# Patient Record
Sex: MALE | Race: WHITE
[De-identification: names, ages, dates, MRNs, and addresses within clinical notes are randomized per-mention and may not be internally consistent; named-entity substitution may affect disease eponyms.]

---

## 2017-01-09 ENCOUNTER — HOSPITAL ENCOUNTER (EMERGENCY)
Dept: HOSPITAL 10 - FTE | Age: 22
Discharge: HOME | End: 2017-01-09
Payer: COMMERCIAL

## 2017-01-09 VITALS — HEIGHT: 60 IN | BODY MASS INDEX: 26.84 KG/M2 | WEIGHT: 136.69 LBS

## 2017-01-09 DIAGNOSIS — J45.909: ICD-10-CM

## 2017-01-09 DIAGNOSIS — S69.92XA: Primary | ICD-10-CM
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
## 2017-01-09 PROCEDURE — 73140 X-RAY EXAM OF FINGER(S): CPT

## 2017-01-09 PROCEDURE — 29130 APPL FINGER SPLINT STATIC: CPT

## 2017-01-09 NOTE — ERD
ER Documentation


Chief Complaint


Date/Time


DATE: 1/9/17 


TIME: 16:26


Chief Complaint


left middle finger blunt trauma. no distress noted.





HPI


This 21-year-old male presents with left middle finger pain after trauma today.

  States that he hit it with a pole that he was pulling on.  He has pain 

primarily at the left third PIP joint without restricted range of motion or 

weakness.  He is a very tiny abrasion without bleeding or lacerations.





ROS


All systems reviewed and are negative except as per history of present illness.





Medications


Home Meds


Active Scripts


Ibuprofen* (Motrin*) 600 Mg Tab, 600 MG PO Q6, #30 TAB


   Prov:SAVAGE SEYMOUR PA-C         4/5/16


[Levetiracetam] 500 MG TAB No Conflict Check, 500 MG PO BID, TAB


   Prov:BOUBACAR RODRIGUEZ MD         9/22/15





Allergies


Allergies:  


Coded Allergies:  


     Penicillins (Verified  Allergy, Mild, RASH, 9/22/15)





PMhx/Soc


History of Surgery:  No


Anesthesia Reaction:  No


Hx Neurological Disorder:  No


Hx Respiratory Disorders:  Yes (ASTHMA,)


Hx Cardiac Disorders:  No


Hx Psychiatric Problems:  No


Hx Miscellaneous Medical Probl:  No


Hx Alcohol Use:  No


Hx Substance Use:  No


Hx Tobacco Use:  No


Smoking Status:  Never smoker





Physical Exam


Vitals





Vital Signs








  Date Time  Temp Pulse Resp B/P Pulse Ox O2 Delivery O2 Flow Rate FiO2


 


1/9/17 15:33 98.8 69 20 124/89 98   








Physical Exam


Const:  [] Alert, non-ill-appearing.


Head:   Atraumatic 


Eyes:    Normal Conjunctiva


ENT:    Normal External Ears, Nose and Mouth.


Neck:               Full range of motion..~ No meningismus.


Resp:    Clear to auscultation bilaterally


Cardio:    Regular rate and rhythm, no murmurs


Abd:    Soft, non tender, non distended. Normal bowel sounds


Skin:    No petechiae or rashes


Back:    No midline or flank tenderness


Ext:    No cyanosis, or edema.  There is mild tenderness and swelling around 

the left third PIP joint.  There is no appreciable deformities.  Patient is 

full range of motion flexion extension of the MCP, PIP and DIP joints.  There 

is a very superficial abrasion medially to the PIP joint.


Neur:    Awake and alert


Psych:    Normal Mood and Affect


Results 24 hrs





 Current Medications








 Medications


  (Trade)  Dose


 Ordered  Sig/Pauline


 Route


 PRN Reason  Start Time


 Stop Time Status Last Admin


Dose Admin


 


 Ibuprofen


  (Motrin)  600 mg  ONCE  ONCE


 PO


   1/9/17 16:00


 1/9/17 16:01 DC  


 











Procedures/MDM


X-ray left middle finger 2V Interpreted by me: 


Bones:                 [No fracture]


Joints:                  [No dislocation]


Foreign body:         [None].  Impression-normal left middle finger x-ray


Patient was placed in a left middle finger extension metal splint.  Patient was 

neurovascular intact after splint.  Patient presents with a small abrasion and 

appears to be a left middle finger sprain without evidence of fracture, 

dislocation, tendon or neurologic deficit or bacterial infection.  We treated 

at home with a splint and further observation.  He is advised to follow-up with 

primary doctor for persistent pain this week or return for fevers, redness, new 

symptoms.





Departure


Diagnosis:  


 Primary Impression:  


 Finger injury


 Encounter type:  initial encounter  Laterality:  left  Qualified Code:  

S69.92XA - Finger injury, left, initial encounter


Condition:  Stable


Patient Instructions:  Sprain Finger





Additional Instructions:  


X-ray read as normal.  Recheck with primary doctor orthopedist for pain next 

week.  Use splint until pain resolves.  Recheck sooner for fevers, redness, new 

symptoms.  Take Tylenol or Advil for pain.











KIRBY ANTHONY MD Jan 9, 2017 16:27

## 2017-01-09 NOTE — RADRPT
PROCEDURE:   Left third digit series 

 

CLINICAL INDICATION:   Pain status post trauma 

 

TECHNIQUE:   Three views.

 

COMPARISON:   Left wrist series 09/21/2015 

 

FINDINGS:

 

No fractures are noted. Joint spaces are well maintained. No erosions are identified.  The soft tiss
ues are unremarkable..

 

IMPRESSION:

 

1.  Normal left third digit series

 

 

 

RPTAT: HDC

_____________________________________________ 

.Niesha Oreilly MD, MD           Date    Time 

Electronically viewed and signed by .Niesha Oreilly MD, MD on 01/09/2017 16:14 

 

D:  01/09/2017 16:14  T:  01/09/2017 16:14

.C/

## 2018-05-19 ENCOUNTER — HOSPITAL ENCOUNTER (EMERGENCY)
Age: 23
Discharge: HOME | End: 2018-05-19

## 2018-05-19 ENCOUNTER — HOSPITAL ENCOUNTER (EMERGENCY)
Dept: HOSPITAL 91 - E/R | Age: 23
Discharge: HOME | End: 2018-05-19
Payer: MEDICAID

## 2018-05-19 DIAGNOSIS — M25.512: Primary | ICD-10-CM

## 2018-05-19 DIAGNOSIS — J45.909: ICD-10-CM

## 2018-05-19 PROCEDURE — 99283 EMERGENCY DEPT VISIT LOW MDM: CPT

## 2019-01-01 ENCOUNTER — HOSPITAL ENCOUNTER (EMERGENCY)
Dept: HOSPITAL 10 - FTE | Age: 24
Discharge: HOME | End: 2019-01-01
Payer: MEDICAID

## 2019-01-01 ENCOUNTER — HOSPITAL ENCOUNTER (EMERGENCY)
Dept: HOSPITAL 91 - FTE | Age: 24
Discharge: HOME | End: 2019-01-01
Payer: COMMERCIAL

## 2019-01-01 VITALS — WEIGHT: 110.23 LBS | BODY MASS INDEX: 21.64 KG/M2 | HEIGHT: 60 IN

## 2019-01-01 VITALS — DIASTOLIC BLOOD PRESSURE: 62 MMHG | SYSTOLIC BLOOD PRESSURE: 118 MMHG | HEART RATE: 77 BPM | RESPIRATION RATE: 18 BRPM

## 2019-01-01 DIAGNOSIS — R19.7: Primary | ICD-10-CM

## 2019-01-01 DIAGNOSIS — J45.909: ICD-10-CM

## 2019-01-01 DIAGNOSIS — R11.2: ICD-10-CM

## 2019-01-01 PROCEDURE — 96372 THER/PROPH/DIAG INJ SC/IM: CPT

## 2019-01-01 PROCEDURE — 99284 EMERGENCY DEPT VISIT MOD MDM: CPT

## 2019-01-01 RX ADMIN — KETOROLAC TROMETHAMINE 1 MG: 30 INJECTION, SOLUTION INTRAMUSCULAR at 17:58

## 2019-01-01 RX ADMIN — ONDANSETRON 1 MG: 4 TABLET, ORALLY DISINTEGRATING ORAL at 16:19

## 2019-01-01 RX ADMIN — ACETAMINOPHEN 1 MG: 325 TABLET, FILM COATED ORAL at 16:19

## 2019-01-01 NOTE — ERD
ER Documentation


Chief Complaint


Chief Complaint





abd pain, nausea, diarrhea, chills, bodyaches, fever





HPI


23-year-old male patient with no significant past medical history presents to 


the ED stating that he has vomiting and diarrhea and when he has these few 


episodes of nonbilious nonbloody vomiting and nonmucoid nonbloody diarrhea, he 


has abdominal pain associated with it.  Denies any current abdominal pain.  


States that he has body aches.  Reports that he feels chills.  Denies any chest 


pain, shortness of breath, constipation, neck stiffness.





ROS


All systems reviewed and are negative except as per history of present illness.





Medications


Home Meds


Active Scripts


Acetaminophen* (Tylophen*) 500 Mg Capsule, 1 CAP PO Q6H PRN for PAIN AND OR 


ELEVATED TEMP, #20 CAP


   Prov:CHEY LI PA-C         1/1/19


Ondansetron (Ondansetron Odt) 4 Mg Tab.rapdis, 4 MG PO Q6H PRN for NAUSEA AND/OR


VOMITING, #10 TAB


   Prov:CHEY LI PA-C         1/1/19


Ibuprofen* (Motrin*) 800 Mg Tab, 800 MG PO Q6, #30 TAB


   Prov:YOANNA MAY PA-C         5/19/18


Cyclobenzaprine Hcl* (Cyclobenzaprine Hcl*) 10 Mg Tablet, 10 MG PO BID, #15 TAB


   Prov:YOANNA MAY PA-C         5/19/18


Ibuprofen* (Motrin*) 600 Mg Tab, 600 MG PO Q6, #30 TAB


   Prov:SAVAGE SEYMOUR PA-C         4/5/16


[Levetiracetam] 500 MG TAB No Conflict Check, 500 MG PO BID, TAB


   Prov:BOUBACAR RODRIGUEZ MD         9/22/15





Allergies


Allergies:  


Coded Allergies:  


     Penicillins (Verified  Allergy, Mild, RASH, 9/22/15)





PMhx/Soc


Medical and Surgical Hx:  pt denies Medical Hx, pt denies Surgical Hx


History of Surgery:  No


Anesthesia Reaction:  No


Hx Neurological Disorder:  No


Hx Respiratory Disorders:  Yes (ASTHMA,)


Hx Cardiac Disorders:  No


Hx Psychiatric Problems:  No


Hx Miscellaneous Medical Probl:  No


Hx Alcohol Use:  No


Hx Substance Use:  No


Hx Tobacco Use:  No


Smoking Status:  Never smoker





FmHx


Family History:  No diabetes, No coronary disease





Physical Exam


Vitals





Vital Signs


  Date      Temp  Pulse  Resp  B/P (MAP)   Pulse Ox  O2          O2 Flow    FiO2


Time                                                 Delivery    Rate


    1/1/19  99.5    111    20      121/68        98


     15:52                           (85)





Physical Exam


Const: Non-ill-appearing, well-nourished. In no acute distress.


Head: Atraumatic, normocephalic 


Eyes: Normal Conjunctiva without injection. No purulent discharge. 


ENT: Normal external ear, nose. Moist oropharynx without tonsillar exudates. 


Non-erythematous pharynx. Uvula midline. No drooling.  No trismus. 


Neck: No cervical midline tenderness.  Full range of motion. No meningismus. No 


cervical lymphadenopathy. No JVD.


Resp: Clear to auscultation bilaterally. No wheezing, rhonchi, rales, or 


crackles. No accessory muscle use. No retractions.


Cardio: Regular rate and rhythm.  No murmurs, rubs or gallops.


Abd: Soft, nontender, non distended. Normal bowel sounds.  No palpable masses.  


No rebound tenderness.  No guarding.  Negative McBurney's point. Negative psoas 


sign. Negative obturator sign.


Skin: No petechiae or rashes


Back: No midline tenderness. No CVA tenderness.


Ext: No cyanosis, or edema.


Neur: Awake and alert. Normal gait. Normal coordination. 


Psych: Normal Mood and Affect


Results 24 hrs





Current Medications


 Medications
   Dose
          Sig/Pauline
       Start Time
   Status  Last


 (Trade)       Ordered        Route
 PRN     Stop Time              Admin
Dose


                              Reason                                Admin


 Ondansetron    4 mg           ONCE  STAT
    1/1/19        DC            1/1/19


HCl
  (Zofran                 ODT
           16:08
 1/1/19                16:19



Odt)                                         16:10


                650 mg         ONCE  ONCE
    1/1/19        DC            1/1/19


Acetaminophen                 PO
            16:30
 1/1/19                16:19




  (Tylenol                                  16:31


Tab)


 Ketorolac
     30 mg          ONCE  STAT
    1/1/19        DC            1/1/19


Tromethamine
                 IM
            17:45
 1/1/19                17:58



 (Toradol)                                   17:46








Procedures/MDM


23-year-old male patient with no significant past medical history presents to 


the ED complaining of vomiting, diarrhea, slight abdominal pain.  Patient is 


afebrile and nontoxic-appearing.  Patient was given Zofran, Tylenol, Toradol 30 


mg IM with improvement of his symptoms.  Patient tolerated oral intake.  Patient


 had a successful p.o. challenge.  Patient reports that his body aches have also


 improved.  Patient symptoms are likely secondary to viral etiology.





Low suspicion for testicular torsion, gastritis, GERD, peptic ulcer disease, 


cholecystitis, choledocholithiasis, cholangitis, pancreatitis, appendicitis, 


bowel obstruction, ileus, volvulus, nephrolithiasis, pyelonephritis, hepatitis, 


perforated viscus, diverticulitis, abdominal hernia, acute abdomen, mesenteric 


ischemia or other emergent conditions.





Diagnosis: Vomiting and Diarrhea


Discharge medications: Tylenol, Zofran 


Follow up with primary care physician in 1-2 days. Instructed patient to return 


to the ED sooner for any worsening symptoms. Patient's questions were answered. 


 Patient is hemodynamically stable. Patient understood and agreed with discharge


 plan. Patient discharged stable.





Disclaimer: Inadvertent spelling and grammatical errors are likely due to 


EHR/dictation software use and do not reflect on the overall quality of patient 


care. Also, please note that the electronic time recorded on this note does not 


necessarily reflect the actual time of the patient encounter.





Departure


Diagnosis:  


   Primary Impression:  


   Vomiting and diarrhea


Condition:  Stable


Patient Instructions:  Self-Care for Vomiting and Diarrhea, Diet, Vomiting Or 


Diarrhea [6Yr-Adult], Vomiting And Diarrhea, Nonspecific (Adult)


Referrals:  


[facility]


YOU HAVE RECEIVED A MEDICAL SCREENING EXAM AND THE RESULTS INDICATE THAT YOU DO 


NOT HAVE A CONDITION THAT REQUIRES URGENT TREATMENT IN THE EMERGENCY DEPARTMENT.





FURTHER EVALUATION AND TREATMENT OF YOUR CONDITION CAN WAIT UNTIL YOU ARE SEEN 


IN YOUR DOCTORS OFFICE WITHIN THE NEXT 1-2 DAYS. IT IS YOUR RESPONSIBILITY TO 


MAKE AN APPOINTMENT FOR FOLOW-UP CARE.





IF YOU HAVE A PRIMARY DOCTOR


--you should call your primary doctor and schedule an appointment





IF YOU DO NOT HAVE A PRIMARY DOCTOR YOU CAN CALL OUR PHYSICIAN REFERRAL HOTLINE 


AT


 [phone] 





IF YOU CAN NOT AFFORD TO SEE A PHYSICIAN YOU CAN CHOSE FROM THE FOLLOWING 


[facility]





[facility] [phone] [address]. [facility] [phone] [address] [facility]. [facility] [phone] [address] [facility] [phone] [address] [facility]. [facility] [phone] [address] [facility]. [facility] [phone] [address] [facility]. [facility]


YOU HAVE RECEIVED A MEDICAL SCREENING EXAM AND THE RESULTS INDICATE THAT YOU DO 


NOT HAVE A CONDITION THAT REQUIRES URGENT TREATMENT IN THE EMERGENCY DEPARTMENT.





FURTHER EVALUATION AND TREATMENT OF YOUR CONDITION CAN WAIT UNTIL YOU ARE SEEN 


IN YOUR DOCTORS OFFICE WITHIN THE NEXT 1-2 DAYS. IT IS YOUR RESPONSIBILITY TO 


MAKE AN APPOINTMENT FOR FOLOW-UP CARE.





IF YOU HAVE A PRIMARY DOCTOR


--you should call your primary doctor and schedule and appointment





IF YOU DO NOT HAVE A PRIMARY DOCTOR YOU CAN CALL OUR PHYSICIAN REFERRAL HOTLINE 


AT [phone].





IF YOU CAN NOT AFFORD TO SEE A PHYSICIAN YOU CAN CHOSE FROM THE FOLLOWING [facility]


INSTITUTIONS:





[facility]


[address] [place] [address]





[facility]


[address] [place], [place] [address]


[address] [place] [address]





[facility] URGENT CARE/SPECIALTIES





Additional Instructions:  


Call your primary care doctor TOMORROW for an appointment during the next 2-3 


days.See the doctor sooner or return here if your condition worsens before your 


appointment time.











CHEY LI PA-C               Jan 1, 2019 18:31

## 2019-01-06 ENCOUNTER — HOSPITAL ENCOUNTER (EMERGENCY)
Dept: HOSPITAL 10 - FTE | Age: 24
Discharge: LEFT BEFORE DISCHARGE | End: 2019-01-06
Payer: SELF-PAY

## 2019-01-06 ENCOUNTER — HOSPITAL ENCOUNTER (EMERGENCY)
Dept: HOSPITAL 91 - FTE | Age: 24
Discharge: LEFT BEFORE DISCHARGE | End: 2019-01-06
Payer: SELF-PAY

## 2019-01-06 VITALS
BODY MASS INDEX: 18.78 KG/M2 | HEIGHT: 68 IN | HEIGHT: 68 IN | BODY MASS INDEX: 18.78 KG/M2 | WEIGHT: 123.9 LBS | WEIGHT: 123.9 LBS

## 2019-01-06 DIAGNOSIS — Z53.21: Primary | ICD-10-CM
